# Patient Record
Sex: MALE | Race: OTHER | Employment: FULL TIME | URBAN - METROPOLITAN AREA
[De-identification: names, ages, dates, MRNs, and addresses within clinical notes are randomized per-mention and may not be internally consistent; named-entity substitution may affect disease eponyms.]

---

## 2018-02-28 ENCOUNTER — OFFICE VISIT (OUTPATIENT)
Dept: FAMILY MEDICINE CLINIC | Facility: CLINIC | Age: 33
End: 2018-02-28

## 2018-02-28 VITALS
HEART RATE: 68 BPM | TEMPERATURE: 98 F | DIASTOLIC BLOOD PRESSURE: 76 MMHG | WEIGHT: 128 LBS | BODY MASS INDEX: 19.4 KG/M2 | HEIGHT: 68 IN | SYSTOLIC BLOOD PRESSURE: 113 MMHG

## 2018-02-28 DIAGNOSIS — L65.9 HAIR LOSS: ICD-10-CM

## 2018-02-28 DIAGNOSIS — Z86.39 HISTORY OF HYPERCHOLESTEROLEMIA: ICD-10-CM

## 2018-02-28 DIAGNOSIS — Z00.00 WELL ADULT EXAM: Primary | ICD-10-CM

## 2018-02-28 PROCEDURE — 99385 PREV VISIT NEW AGE 18-39: CPT | Performed by: FAMILY MEDICINE

## 2018-02-28 RX ORDER — FINASTERIDE 5 MG/1
5 TABLET, FILM COATED ORAL DAILY
COMMUNITY
End: 2018-05-14

## 2018-02-28 NOTE — PROGRESS NOTES
Fili Landry is a 34year old male who presents for a complete physical exam.   HPI:   Pt complains of Patient presents with:  Routine Physical      Wt Readings from Last 6 Encounters:  02/28/18 : 128 lb (58.1 kg)        Current Outpatient Prescription lesions  HEENT: atraumatic, normocephalic,ears and throat are clear  EYES:PERRLA, EOMI, conjunctiva are clear, extraocular movements intact  NECK: supple,no adenopathy,no bruits  CHEST: no chest tenderness  BREAST: no dominant or suspicious mass  LUNGS: cl

## 2018-03-03 ENCOUNTER — LAB ENCOUNTER (OUTPATIENT)
Dept: LAB | Age: 33
End: 2018-03-03
Attending: FAMILY MEDICINE
Payer: COMMERCIAL

## 2018-03-03 DIAGNOSIS — Z00.00 WELL ADULT EXAM: ICD-10-CM

## 2018-03-03 LAB
ALT SERPL-CCNC: 17 U/L (ref 17–63)
ANION GAP SERPL CALC-SCNC: 8 MMOL/L (ref 0–18)
AST SERPL-CCNC: 22 U/L (ref 15–41)
BASOPHILS # BLD: 0.1 K/UL (ref 0–0.2)
BASOPHILS NFR BLD: 1 %
BUN SERPL-MCNC: 9 MG/DL (ref 8–20)
BUN/CREAT SERPL: 10.7 (ref 10–20)
CALCIUM SERPL-MCNC: 9.4 MG/DL (ref 8.5–10.5)
CHLORIDE SERPL-SCNC: 102 MMOL/L (ref 95–110)
CHOLEST SERPL-MCNC: 274 MG/DL (ref 110–200)
CO2 SERPL-SCNC: 28 MMOL/L (ref 22–32)
CREAT SERPL-MCNC: 0.84 MG/DL (ref 0.5–1.5)
EOSINOPHIL # BLD: 0.2 K/UL (ref 0–0.7)
EOSINOPHIL NFR BLD: 4 %
ERYTHROCYTE [DISTWIDTH] IN BLOOD BY AUTOMATED COUNT: 12.2 % (ref 11–15)
GLUCOSE SERPL-MCNC: 79 MG/DL (ref 70–99)
HCT VFR BLD AUTO: 43.6 % (ref 41–52)
HDLC SERPL-MCNC: 40 MG/DL
HGB BLD-MCNC: 14.9 G/DL (ref 13.5–17.5)
LDLC SERPL CALC-MCNC: 213 MG/DL (ref 0–99)
LYMPHOCYTES # BLD: 1.9 K/UL (ref 1–4)
LYMPHOCYTES NFR BLD: 33 %
MCH RBC QN AUTO: 30.4 PG (ref 27–32)
MCHC RBC AUTO-ENTMCNC: 34.2 G/DL (ref 32–37)
MCV RBC AUTO: 88.8 FL (ref 80–100)
MONOCYTES # BLD: 0.4 K/UL (ref 0–1)
MONOCYTES NFR BLD: 6 %
NEUTROPHILS # BLD AUTO: 3.2 K/UL (ref 1.8–7.7)
NEUTROPHILS NFR BLD: 56 %
NONHDLC SERPL-MCNC: 234 MG/DL
OSMOLALITY UR CALC.SUM OF ELEC: 284 MOSM/KG (ref 275–295)
PLATELET # BLD AUTO: 167 K/UL (ref 140–400)
PMV BLD AUTO: 8.2 FL (ref 7.4–10.3)
POTASSIUM SERPL-SCNC: 3.6 MMOL/L (ref 3.3–5.1)
RBC # BLD AUTO: 4.92 M/UL (ref 4.5–5.9)
SODIUM SERPL-SCNC: 138 MMOL/L (ref 136–144)
TRIGL SERPL-MCNC: 107 MG/DL (ref 1–149)
TSH SERPL-ACNC: 2.3 UIU/ML (ref 0.45–5.33)
WBC # BLD AUTO: 5.6 K/UL (ref 4–11)

## 2018-03-03 PROCEDURE — 85025 COMPLETE CBC W/AUTO DIFF WBC: CPT

## 2018-03-03 PROCEDURE — 84450 TRANSFERASE (AST) (SGOT): CPT

## 2018-03-03 PROCEDURE — 80061 LIPID PANEL: CPT

## 2018-03-03 PROCEDURE — 80048 BASIC METABOLIC PNL TOTAL CA: CPT

## 2018-03-03 PROCEDURE — 84460 ALANINE AMINO (ALT) (SGPT): CPT

## 2018-03-03 PROCEDURE — 84443 ASSAY THYROID STIM HORMONE: CPT

## 2018-03-03 PROCEDURE — 36415 COLL VENOUS BLD VENIPUNCTURE: CPT

## 2018-03-08 PROBLEM — E78.00 HYPERCHOLESTEREMIA: Status: ACTIVE | Noted: 2018-03-08

## 2018-05-12 ENCOUNTER — PATIENT MESSAGE (OUTPATIENT)
Dept: FAMILY MEDICINE CLINIC | Facility: CLINIC | Age: 33
End: 2018-05-12

## 2018-05-14 NOTE — TELEPHONE ENCOUNTER
Please advise on refill, not listed as prescribed by us, only historically taken. PER LOV NOTES  2.  Hair loss  Was on finasteride but not any more     ·   Recent Outpatient Visits            2 months ago Well adult exam    Aundrea Kruse

## 2018-05-14 NOTE — TELEPHONE ENCOUNTER
From: Joana Almanzar  To: Austin Millan MD  Sent: 5/12/2018 10:55 AM CDT  Subject: Prescription Question    Hi Dr Jayson Virk, I am out of my finasteride. Can I get a refill?

## 2018-05-15 RX ORDER — FINASTERIDE 5 MG/1
5 TABLET, FILM COATED ORAL DAILY
Qty: 90 TABLET | Refills: 0 | Status: SHIPPED | OUTPATIENT
Start: 2018-05-15 | End: 2018-08-11

## 2018-07-26 ENCOUNTER — NURSE TRIAGE (OUTPATIENT)
Dept: FAMILY MEDICINE CLINIC | Facility: CLINIC | Age: 33
End: 2018-07-26

## 2018-07-27 NOTE — TELEPHONE ENCOUNTER
----- Message from Betsy Robledo sent at 7/26/2018  7:30 PM CDT -----  Regarding: Non-Urgent Medical Question  Contact: [phone]  Hi Dr. Rd Lr I am experiencing a new problem since few weeks. In the morning I have 1-2 episodes of vomiting almost every

## 2018-07-30 ENCOUNTER — OFFICE VISIT (OUTPATIENT)
Dept: FAMILY MEDICINE CLINIC | Facility: CLINIC | Age: 33
End: 2018-07-30
Payer: COMMERCIAL

## 2018-07-30 VITALS
WEIGHT: 128 LBS | DIASTOLIC BLOOD PRESSURE: 76 MMHG | SYSTOLIC BLOOD PRESSURE: 121 MMHG | BODY MASS INDEX: 19.4 KG/M2 | HEIGHT: 68 IN | TEMPERATURE: 97 F | HEART RATE: 66 BPM

## 2018-07-30 DIAGNOSIS — R11.10 NON-INTRACTABLE VOMITING, PRESENCE OF NAUSEA NOT SPECIFIED, UNSPECIFIED VOMITING TYPE: Primary | ICD-10-CM

## 2018-07-30 DIAGNOSIS — F41.9 ANXIETY: ICD-10-CM

## 2018-07-30 PROCEDURE — 99213 OFFICE O/P EST LOW 20 MIN: CPT | Performed by: PHYSICIAN ASSISTANT

## 2018-07-30 PROCEDURE — 99212 OFFICE O/P EST SF 10 MIN: CPT | Performed by: PHYSICIAN ASSISTANT

## 2018-07-30 RX ORDER — ONDANSETRON 4 MG/1
4 TABLET, FILM COATED ORAL EVERY 8 HOURS PRN
Qty: 20 TABLET | Refills: 0 | Status: SHIPPED | OUTPATIENT
Start: 2018-07-30

## 2018-07-30 NOTE — TELEPHONE ENCOUNTER
Action Requested: Summary for Provider     []  Critical Lab, Recommendations Needed  [] Need Additional Advice  []   FYI    []   Need Orders  [] Need Medications Sent to Pharmacy  []  Other     SUMMARY: Appt with Neil Woods for today 7/30/18 at 5:45 pm Zack

## 2018-07-30 NOTE — PROGRESS NOTES
HPI:    Patient ID: Shaneka Guillen is a 34year old male. Patient presents for vomiting after brushing teeth in the morning occurring over past few weeks. Patient just moved and began new job at onset of symptoms.   Patient states wakes earlier in the and time. He appears well-developed and well-nourished. No distress. HENT:   Mouth/Throat: Oropharynx is clear and moist.   Neck: Neck supple. Abdominal: Soft. Bowel sounds are normal. He exhibits no distension.    Lymphadenopathy:     He has no cervica

## 2018-08-12 NOTE — TELEPHONE ENCOUNTER
LOV: 7-30-18 Last Rx: 5-15-18     No protocol     Please advise in regards to refill request. Thank You

## 2018-08-13 RX ORDER — FINASTERIDE 5 MG/1
TABLET, FILM COATED ORAL
Qty: 90 TABLET | Refills: 0 | Status: SHIPPED | OUTPATIENT
Start: 2018-08-13